# Patient Record
Sex: MALE | Race: OTHER | HISPANIC OR LATINO | URBAN - METROPOLITAN AREA
[De-identification: names, ages, dates, MRNs, and addresses within clinical notes are randomized per-mention and may not be internally consistent; named-entity substitution may affect disease eponyms.]

---

## 2018-09-21 ENCOUNTER — EMERGENCY (EMERGENCY)
Facility: HOSPITAL | Age: 48
LOS: 1 days | Discharge: HOME | End: 2018-09-21
Admitting: EMERGENCY MEDICINE
Payer: COMMERCIAL

## 2018-09-21 VITALS
RESPIRATION RATE: 17 BRPM | WEIGHT: 169.98 LBS | OXYGEN SATURATION: 97 % | SYSTOLIC BLOOD PRESSURE: 138 MMHG | TEMPERATURE: 98 F | HEART RATE: 88 BPM | DIASTOLIC BLOOD PRESSURE: 79 MMHG

## 2018-09-21 DIAGNOSIS — L02.411 CUTANEOUS ABSCESS OF RIGHT AXILLA: ICD-10-CM

## 2018-09-21 DIAGNOSIS — E11.9 TYPE 2 DIABETES MELLITUS WITHOUT COMPLICATIONS: ICD-10-CM

## 2018-09-21 DIAGNOSIS — Z79.2 LONG TERM (CURRENT) USE OF ANTIBIOTICS: ICD-10-CM

## 2018-09-21 DIAGNOSIS — Z79.899 OTHER LONG TERM (CURRENT) DRUG THERAPY: ICD-10-CM

## 2018-09-21 PROCEDURE — 99283 EMERGENCY DEPT VISIT LOW MDM: CPT

## 2018-09-21 NOTE — ED ADULT NURSE NOTE - BREATHING, MLM
45 y/o  Spontaneous, unlabored and symmetrical 47 y/o  female presents to [facility] for pre op evaluation with h/o umbilical hernia in preparation for umbilical hernia repair on 09/08/17.

## 2018-09-21 NOTE — ED PROVIDER NOTE - MEDICAL DECISION MAKING DETAILS
47 y/o m right axillary abscess; needle aspiration with no pus, will d/c on clindamycin, warm compresses and f/u surgery

## 2018-09-21 NOTE — ED PROVIDER NOTE - OBJECTIVE STATEMENT
47 y/o m hx recurrent axillary abscesses presents c/o pain, redness to right axilla for the past 3 weeks.  Pt stating it opened up this morning, small amount of pus came out.  Denies fever, chills, all other ROS negative.

## 2018-09-21 NOTE — ED ADULT NURSE NOTE - NSIMPLEMENTINTERV_GEN_ALL_ED
Implemented All Universal Safety Interventions:  [place] to call system. Call bell, personal items and telephone within reach. Instruct patient to call for assistance. Room bathroom lighting operational. Non-slip footwear when patient is off stretcher. Physically safe environment: no spills, clutter or unnecessary equipment. Stretcher in lowest position, wheels locked, appropriate side rails in place.

## 2023-10-17 ENCOUNTER — EMERGENCY (EMERGENCY)
Facility: HOSPITAL | Age: 53
LOS: 1 days | Discharge: HOME | End: 2023-10-17
Attending: EMERGENCY MEDICINE | Admitting: EMERGENCY MEDICINE
Payer: COMMERCIAL

## 2023-10-17 VITALS
RESPIRATION RATE: 18 BRPM | OXYGEN SATURATION: 98 % | DIASTOLIC BLOOD PRESSURE: 100 MMHG | HEART RATE: 84 BPM | SYSTOLIC BLOOD PRESSURE: 170 MMHG | TEMPERATURE: 98 F

## 2023-10-17 VITALS
SYSTOLIC BLOOD PRESSURE: 155 MMHG | OXYGEN SATURATION: 98 % | RESPIRATION RATE: 16 BRPM | HEART RATE: 78 BPM | DIASTOLIC BLOOD PRESSURE: 67 MMHG

## 2023-10-17 LAB
ALBUMIN SERPL ELPH-MCNC: 4.7 G/DL — SIGNIFICANT CHANGE UP (ref 3.3–5)
ALBUMIN SERPL ELPH-MCNC: 4.7 G/DL — SIGNIFICANT CHANGE UP (ref 3.3–5)
ALP SERPL-CCNC: 66 U/L — SIGNIFICANT CHANGE UP (ref 40–120)
ALP SERPL-CCNC: 66 U/L — SIGNIFICANT CHANGE UP (ref 40–120)
ALT FLD-CCNC: 23 U/L — SIGNIFICANT CHANGE UP (ref 10–45)
ALT FLD-CCNC: 23 U/L — SIGNIFICANT CHANGE UP (ref 10–45)
ANION GAP SERPL CALC-SCNC: 10 MMOL/L — SIGNIFICANT CHANGE UP (ref 5–17)
ANION GAP SERPL CALC-SCNC: 10 MMOL/L — SIGNIFICANT CHANGE UP (ref 5–17)
APPEARANCE UR: CLEAR — SIGNIFICANT CHANGE UP
APPEARANCE UR: CLEAR — SIGNIFICANT CHANGE UP
AST SERPL-CCNC: 24 U/L — SIGNIFICANT CHANGE UP (ref 10–40)
AST SERPL-CCNC: 24 U/L — SIGNIFICANT CHANGE UP (ref 10–40)
BASOPHILS # BLD AUTO: 0.05 K/UL — SIGNIFICANT CHANGE UP (ref 0–0.2)
BASOPHILS # BLD AUTO: 0.05 K/UL — SIGNIFICANT CHANGE UP (ref 0–0.2)
BASOPHILS NFR BLD AUTO: 0.9 % — SIGNIFICANT CHANGE UP (ref 0–2)
BASOPHILS NFR BLD AUTO: 0.9 % — SIGNIFICANT CHANGE UP (ref 0–2)
BILIRUB SERPL-MCNC: 0.3 MG/DL — SIGNIFICANT CHANGE UP (ref 0.2–1.2)
BILIRUB SERPL-MCNC: 0.3 MG/DL — SIGNIFICANT CHANGE UP (ref 0.2–1.2)
BILIRUB UR-MCNC: NEGATIVE — SIGNIFICANT CHANGE UP
BILIRUB UR-MCNC: NEGATIVE — SIGNIFICANT CHANGE UP
BUN SERPL-MCNC: 11 MG/DL — SIGNIFICANT CHANGE UP (ref 7–23)
BUN SERPL-MCNC: 11 MG/DL — SIGNIFICANT CHANGE UP (ref 7–23)
CALCIUM SERPL-MCNC: 10.1 MG/DL — SIGNIFICANT CHANGE UP (ref 8.4–10.5)
CALCIUM SERPL-MCNC: 10.1 MG/DL — SIGNIFICANT CHANGE UP (ref 8.4–10.5)
CHLORIDE SERPL-SCNC: 97 MMOL/L — SIGNIFICANT CHANGE UP (ref 96–108)
CHLORIDE SERPL-SCNC: 97 MMOL/L — SIGNIFICANT CHANGE UP (ref 96–108)
CO2 SERPL-SCNC: 29 MMOL/L — SIGNIFICANT CHANGE UP (ref 22–31)
CO2 SERPL-SCNC: 29 MMOL/L — SIGNIFICANT CHANGE UP (ref 22–31)
COLOR SPEC: YELLOW — SIGNIFICANT CHANGE UP
COLOR SPEC: YELLOW — SIGNIFICANT CHANGE UP
CREAT SERPL-MCNC: 0.78 MG/DL — SIGNIFICANT CHANGE UP (ref 0.5–1.3)
CREAT SERPL-MCNC: 0.78 MG/DL — SIGNIFICANT CHANGE UP (ref 0.5–1.3)
DIFF PNL FLD: NEGATIVE — SIGNIFICANT CHANGE UP
DIFF PNL FLD: NEGATIVE — SIGNIFICANT CHANGE UP
EGFR: 108 ML/MIN/1.73M2 — SIGNIFICANT CHANGE UP
EGFR: 108 ML/MIN/1.73M2 — SIGNIFICANT CHANGE UP
EOSINOPHIL # BLD AUTO: 0.18 K/UL — SIGNIFICANT CHANGE UP (ref 0–0.5)
EOSINOPHIL # BLD AUTO: 0.18 K/UL — SIGNIFICANT CHANGE UP (ref 0–0.5)
EOSINOPHIL NFR BLD AUTO: 3.1 % — SIGNIFICANT CHANGE UP (ref 0–6)
EOSINOPHIL NFR BLD AUTO: 3.1 % — SIGNIFICANT CHANGE UP (ref 0–6)
GLUCOSE SERPL-MCNC: 170 MG/DL — HIGH (ref 70–99)
GLUCOSE SERPL-MCNC: 170 MG/DL — HIGH (ref 70–99)
GLUCOSE UR QL: >=1000
GLUCOSE UR QL: >=1000
HCT VFR BLD CALC: 44.3 % — SIGNIFICANT CHANGE UP (ref 39–50)
HCT VFR BLD CALC: 44.3 % — SIGNIFICANT CHANGE UP (ref 39–50)
HGB BLD-MCNC: 15 G/DL — SIGNIFICANT CHANGE UP (ref 13–17)
HGB BLD-MCNC: 15 G/DL — SIGNIFICANT CHANGE UP (ref 13–17)
IMM GRANULOCYTES NFR BLD AUTO: 0.3 % — SIGNIFICANT CHANGE UP (ref 0–0.9)
IMM GRANULOCYTES NFR BLD AUTO: 0.3 % — SIGNIFICANT CHANGE UP (ref 0–0.9)
KETONES UR-MCNC: NEGATIVE — SIGNIFICANT CHANGE UP
KETONES UR-MCNC: NEGATIVE — SIGNIFICANT CHANGE UP
LEUKOCYTE ESTERASE UR-ACNC: NEGATIVE — SIGNIFICANT CHANGE UP
LEUKOCYTE ESTERASE UR-ACNC: NEGATIVE — SIGNIFICANT CHANGE UP
LIDOCAIN IGE QN: 127 U/L — HIGH (ref 7–60)
LIDOCAIN IGE QN: 127 U/L — HIGH (ref 7–60)
LYMPHOCYTES # BLD AUTO: 1.67 K/UL — SIGNIFICANT CHANGE UP (ref 1–3.3)
LYMPHOCYTES # BLD AUTO: 1.67 K/UL — SIGNIFICANT CHANGE UP (ref 1–3.3)
LYMPHOCYTES # BLD AUTO: 29.2 % — SIGNIFICANT CHANGE UP (ref 13–44)
LYMPHOCYTES # BLD AUTO: 29.2 % — SIGNIFICANT CHANGE UP (ref 13–44)
MCHC RBC-ENTMCNC: 30.6 PG — SIGNIFICANT CHANGE UP (ref 27–34)
MCHC RBC-ENTMCNC: 30.6 PG — SIGNIFICANT CHANGE UP (ref 27–34)
MCHC RBC-ENTMCNC: 33.9 GM/DL — SIGNIFICANT CHANGE UP (ref 32–36)
MCHC RBC-ENTMCNC: 33.9 GM/DL — SIGNIFICANT CHANGE UP (ref 32–36)
MCV RBC AUTO: 90.4 FL — SIGNIFICANT CHANGE UP (ref 80–100)
MCV RBC AUTO: 90.4 FL — SIGNIFICANT CHANGE UP (ref 80–100)
MONOCYTES # BLD AUTO: 0.39 K/UL — SIGNIFICANT CHANGE UP (ref 0–0.9)
MONOCYTES # BLD AUTO: 0.39 K/UL — SIGNIFICANT CHANGE UP (ref 0–0.9)
MONOCYTES NFR BLD AUTO: 6.8 % — SIGNIFICANT CHANGE UP (ref 2–14)
MONOCYTES NFR BLD AUTO: 6.8 % — SIGNIFICANT CHANGE UP (ref 2–14)
NEUTROPHILS # BLD AUTO: 3.41 K/UL — SIGNIFICANT CHANGE UP (ref 1.8–7.4)
NEUTROPHILS # BLD AUTO: 3.41 K/UL — SIGNIFICANT CHANGE UP (ref 1.8–7.4)
NEUTROPHILS NFR BLD AUTO: 59.7 % — SIGNIFICANT CHANGE UP (ref 43–77)
NEUTROPHILS NFR BLD AUTO: 59.7 % — SIGNIFICANT CHANGE UP (ref 43–77)
NITRITE UR-MCNC: NEGATIVE — SIGNIFICANT CHANGE UP
NITRITE UR-MCNC: NEGATIVE — SIGNIFICANT CHANGE UP
NRBC # BLD: 0 /100 WBCS — SIGNIFICANT CHANGE UP (ref 0–0)
NRBC # BLD: 0 /100 WBCS — SIGNIFICANT CHANGE UP (ref 0–0)
PH UR: 7.5 — SIGNIFICANT CHANGE UP (ref 5–8)
PH UR: 7.5 — SIGNIFICANT CHANGE UP (ref 5–8)
PLATELET # BLD AUTO: 216 K/UL — SIGNIFICANT CHANGE UP (ref 150–400)
PLATELET # BLD AUTO: 216 K/UL — SIGNIFICANT CHANGE UP (ref 150–400)
POTASSIUM SERPL-MCNC: 4.4 MMOL/L — SIGNIFICANT CHANGE UP (ref 3.5–5.3)
POTASSIUM SERPL-MCNC: 4.4 MMOL/L — SIGNIFICANT CHANGE UP (ref 3.5–5.3)
POTASSIUM SERPL-SCNC: 4.4 MMOL/L — SIGNIFICANT CHANGE UP (ref 3.5–5.3)
POTASSIUM SERPL-SCNC: 4.4 MMOL/L — SIGNIFICANT CHANGE UP (ref 3.5–5.3)
PROT SERPL-MCNC: 7.8 G/DL — SIGNIFICANT CHANGE UP (ref 6–8.3)
PROT SERPL-MCNC: 7.8 G/DL — SIGNIFICANT CHANGE UP (ref 6–8.3)
PROT UR-MCNC: NEGATIVE MG/DL — SIGNIFICANT CHANGE UP
PROT UR-MCNC: NEGATIVE MG/DL — SIGNIFICANT CHANGE UP
RBC # BLD: 4.9 M/UL — SIGNIFICANT CHANGE UP (ref 4.2–5.8)
RBC # BLD: 4.9 M/UL — SIGNIFICANT CHANGE UP (ref 4.2–5.8)
RBC # FLD: 11.9 % — SIGNIFICANT CHANGE UP (ref 10.3–14.5)
RBC # FLD: 11.9 % — SIGNIFICANT CHANGE UP (ref 10.3–14.5)
SODIUM SERPL-SCNC: 136 MMOL/L — SIGNIFICANT CHANGE UP (ref 135–145)
SODIUM SERPL-SCNC: 136 MMOL/L — SIGNIFICANT CHANGE UP (ref 135–145)
SP GR SPEC: 1.01 — SIGNIFICANT CHANGE UP (ref 1–1.03)
SP GR SPEC: 1.01 — SIGNIFICANT CHANGE UP (ref 1–1.03)
UROBILINOGEN FLD QL: 0.2 E.U./DL — SIGNIFICANT CHANGE UP
UROBILINOGEN FLD QL: 0.2 E.U./DL — SIGNIFICANT CHANGE UP
WBC # BLD: 5.72 K/UL — SIGNIFICANT CHANGE UP (ref 3.8–10.5)
WBC # BLD: 5.72 K/UL — SIGNIFICANT CHANGE UP (ref 3.8–10.5)
WBC # FLD AUTO: 5.72 K/UL — SIGNIFICANT CHANGE UP (ref 3.8–10.5)
WBC # FLD AUTO: 5.72 K/UL — SIGNIFICANT CHANGE UP (ref 3.8–10.5)

## 2023-10-17 PROCEDURE — 81003 URINALYSIS AUTO W/O SCOPE: CPT

## 2023-10-17 PROCEDURE — 99284 EMERGENCY DEPT VISIT MOD MDM: CPT | Mod: 25

## 2023-10-17 PROCEDURE — 80053 COMPREHEN METABOLIC PANEL: CPT

## 2023-10-17 PROCEDURE — 74177 CT ABD & PELVIS W/CONTRAST: CPT | Mod: MA

## 2023-10-17 PROCEDURE — 36415 COLL VENOUS BLD VENIPUNCTURE: CPT

## 2023-10-17 PROCEDURE — 85025 COMPLETE CBC W/AUTO DIFF WBC: CPT

## 2023-10-17 PROCEDURE — 83690 ASSAY OF LIPASE: CPT

## 2023-10-17 PROCEDURE — 74177 CT ABD & PELVIS W/CONTRAST: CPT | Mod: 26,MA

## 2023-10-17 PROCEDURE — 99285 EMERGENCY DEPT VISIT HI MDM: CPT

## 2023-10-17 RX ORDER — SODIUM CHLORIDE 9 MG/ML
1000 INJECTION INTRAMUSCULAR; INTRAVENOUS; SUBCUTANEOUS ONCE
Refills: 0 | Status: COMPLETED | OUTPATIENT
Start: 2023-10-17 | End: 2023-10-17

## 2023-10-17 RX ADMIN — SODIUM CHLORIDE 1000 MILLILITER(S): 9 INJECTION INTRAMUSCULAR; INTRAVENOUS; SUBCUTANEOUS at 18:58

## 2023-10-17 NOTE — ED PROVIDER NOTE - OBJECTIVE STATEMENT
53 yr old M with PMH DM presents with 4-5 days of RUQ and R side pain. Reports pain started when he was at work ([employer], requires heavy lifting), had to go home due to pain. Located under R rib laterally and continues along RUQ. Describes pain as stabbing, 5/10 in severity, not associated with PO intake, and occurs intermittently for 20 minute periods 6-7 times per day. Reports Motrin 800mg manages the pain, last taken today 2pm. Denies injury or strain. Reports he noted a lump on his right side 2-3 weeks ago where the pain is located. Denies fever, CP, SOB, cough, N/V, diarrhea, constipation, melena/hematochezia, dysuria, hematuria. Denies cigarette use, reports cocaine use on weekends.

## 2023-10-17 NOTE — ED PROVIDER NOTE - ATTENDING APP SHARED VISIT CONTRIBUTION OF CARE
53 yr old M with PMH DM presents with 4-5 days of RUQ and R side pain. Reports pain started when he was at work ([place], requires heavy lifting), had to go home due to pain. Located under R rib laterally and continues along RUQ. Describes pain as stabbing, 5/10 in severity, not associated with PO intake, and occurs intermittently for 20 minute periods 6-7 times per day. Reports Motrin 800mg manages the pain, last taken today 2pm. Denies injury or strain. Reports he noted a lump on his right side 2-3 weeks ago where the pain is located. Denies fever, CP, SOB, cough, N/V, diarrhea, constipation, melena/hematochezia, dysuria, hematuria. Denies cigarette use, reports cocaine use on weekends  vss  s1s2 lungs cta bl  abd + mild R upper abd ttp no rebound no guarding  IMP- ABD pain  elevated lipase- nml lfts and wbc count- check CT a/p

## 2023-10-17 NOTE — ED PROVIDER NOTE - PATIENT PORTAL LINK FT
You can access the FollowMyHealth Patient Portal offered by [facility] by registering at the following website: [email or web address]. By joining [facility]’s FollowMyHealth portal, you will also be able to view your health information using other applications (apps) compatible with our system.

## 2023-10-17 NOTE — ED ADULT NURSE NOTE - NSFALLUNIVINTERV_ED_ALL_ED
Bed/Stretcher in lowest position, wheels locked, appropriate side rails in place/Call bell, personal items and telephone in reach/Instruct patient to call for assistance before getting out of bed/chair/stretcher/Non-slip footwear applied when patient is off stretcher/[place] to call system/Physically safe environment - no spills, clutter or unnecessary equipment/Purposeful proactive rounding/Room/bathroom lighting operational, light cord in reach

## 2023-10-17 NOTE — ED PROVIDER NOTE - NSFOLLOWUPINSTRUCTIONS_ED_ALL_ED_FT
Pancreatitis aguda  Acute Pancreatitis  Body outline showing body parts used in digestion. A close-up shows a normal pancreas and a swollen pancreas.  La pancreatitis aguda se produce cuando hay hinchazón e irritación repentinas del páncreas. El páncreas es arthur glándula del cuerpo que ayuda a controlar el azúcar en la liana. Esta glándula también ayuda a digerir los alimentos.    Esta afección puede durar algunos días y causar problemas graves. Algunos problemas pueden ser potencialmente mortales. Los pulmones, el corazón y los riñones pueden dejar de funcionar.    ¿Cuáles son las causas?  Las causas pueden ser las siguientes:  Consumo excesivo de alcohol.  Consumo de drogas.  Cálculos biliares.  Crecimiento anormal de tejido (tumor) en el páncreas.  Otras causas son:  Algunos medicamentos o algunas sustancias químicas.  Diabetes o infección.  Niveles altos de un tipo de grasa en la liana.  Niveles altos de calcio en la liana.  Daños causados por:  Un accidente.  La sustancia venenosa (veneno) de arthur picadura de escorpión.  Arthur ciurgía en el vientre (abdominal).  El ataque por parte del sistema de defensa del cuerpo (sistema inmunitario) al páncreas (pancreatitis autoinmunitaria).  Genes que se transmiten de padres a hijos (hereditarios).  A veces, la causa es desconocida.    ¿Cuáles son los signos o síntomas?  Dolor en la parte superior del vientre que puede sentirse en la espalda. El dolor puede ser muy intenso. Suele empeorar después de comer.  Dolor e hinchazón en el vientre.  Sensación de que va a vomitar (náuseas) y vómitos.  Fiebre.  ¿Cómo se trata?  Arthur hospitalización, en muchos casos.  Analgésicos.  Líquidos a través de un tubo (catéter) intravenoso.  Colocación de un tubo en el estómago para extraer el contenido del estómago. [place] también lo ayuda a dejar de vomitar.  No comiendo hasta que vomite menos.  Antibióticos en el alonzo de arthur infección.  Medicamentos con corticoesteroides, si la causa de zimmerman problema son los ataques de zimmerman sistema de defensa contra los tejidos propios del cuerpo.  Cirugía, si la causa del problema son cálculos biliares u otra obstrucción.  Tratamiento de otro problema de juan luis que pueda ser la causa.  Siga estas indicaciones en zimmerman casa:  Medicamentos    Use los medicamentos de venta juan carlos y los recetados solamente juliana se lo haya indicado el médico.  Si le recetaron un antibiótico, tómelo juliana se lo haya indicado el médico. No deje de tomarlo aunque comience a sentirse mejor.  Si se lo indican, tome medidas a fin de prevenir problemas para ir de cuerpo (estreñimiento). Es posible que deba hacer lo siguiente:  [place]icruz medicamentos. Le dirán qué medicamentos debe maricruz.  [place] alimentos ricos en fibra. Entre ellos, frijoles, cereales integrales y frutas y verduras frescas.  Limitar los alimentos con alto contenido de grasa y azúcar. Estos incluyen alimentos fritos o dulces.  Pregunte al médico si debe evitar conducir o utilizar máquinas mientras giancarlo los medicamentos.  Comida y bebida    Three cups showing dark yellow, yellow, and pale yellow urine.  Siga las instrucciones del médico sobre qué comer y beber. Es posible que deba hacer lo siguiente:  Evite maricruz alcohol.  Consuma alimentos que no tengan mucha grasa.  Consuma pequeñas cantidades de comida con más frecuencia. No coma en gran cantidad.  [place] suficiente líquido para mantener el pis (la orina) de color amarillo pálido.  No consuma alcohol si esta fue la causa de zimmerman afección.  Indicaciones generales    No fume ni consuma ningún producto que contenga nicotina o tabaco. Si necesita ayuda para dejar de fumar, consulte al médico.  Descanse lo suficiente.  Controle zimmerman nivel de azúcar en la liana en zimmerman casa sang juliana le indicó el médico.  Concurra a todas las visitas de seguimiento.  Comuníquese con un médico si:  No mejora tan rápido juliana esperaba.  Sonya síntomas empeoran.  Aparecen nuevos síntomas.  Tiene dolor o debilidad que dura mucho tiempo.  Tiene ganas continuas de vomitar.  Mejora y luego el dolor reaparece.  Tiene fiebre.  Solicite ayuda de inmediato si:  Vomita cada vez que come o maria de jesus.  El dolor es muy intenso.  La piel y la parte virginie de los ojos se ponen amarillos.  El vientre se le hincha de manera súbita.  Se siente mareado o se desmaya.  Zimmerman nivel de azúcar en la liana es alto (más de 300 mg/dl).  Vomita liana.  Estos síntomas pueden indicar arthur emergencia. No espere a arlene si los síntomas desaparecen. Solicite ayuda de inmediato. Llame al 911.    Resumen  La pancreatitis aguda se produce cuando hay hinchazón e irritación repentinas del páncreas.  Normalmente, esta afección es causada por el consumo excesivo de alcohol, el consumo de drogas o la presencia de cálculos biliares.  Es probable que deba permanecer en el hospital para recibir tratamiento.  Esta información no tiene juliana fin reemplazar el consejo del médico. Asegúrese de hacerle al médico cualquier pregunta que tenga.    Document Revised: 11/17/2022 Document Reviewed: 11/17/2022

## 2023-10-17 NOTE — ED ADULT NURSE NOTE - SUICIDE SCREENING QUESTION 2
Patient is resting comfortably.  at bedside. Updated the patient that we are waiting a call back from Cardiologist. Patient is not in any acute distress. Will continue to monitor   No

## 2023-10-17 NOTE — ED PROVIDER NOTE - CLINICAL SUMMARY MEDICAL DECISION MAKING FREE TEXT BOX
52 yo M with PMH DM presents with 4-5 days of intermittent RUQ and R side pain not associated with PO intake. VS on arrival T 98.5, HR 84, /100, RR 18, SpO2 98% on RA. Physical exam remarkable for mild RUQ tenderness, R side tender to palpation, and R 2cm lump at area of tenderness. CBC unremarkable. UA remarkable for glucose >1000. Will administer IV fluids, obtain CMP and lipase. Differentials include cholecystitis, hepatitis, fatty liver, GERD, PUD, pancreatitis, PNA, MSK/trauma. 52 yo M with PMH DM presents with 4-5 days of intermittent RUQ and R side pain not associated with PO intake. VS on arrival T 98.5, HR 84, /100, RR 18, SpO2 98% on RA. Physical exam remarkable for mild RUQ tenderness, R side tender to palpation, and R 2cm lump at area of tenderness. CBC unremarkable. UA remarkable for glucose >1000. Will administer IV fluids, obtain CMP and lipase. Differentials include cholecystitis, biliary colic, hepatitis, fatty liver, GERD, PUD, pancreatitis, PNA, MSK/trauma. 52 yo M with PMH DM presents with 4-5 days of intermittent RUQ and R side pain not associated with PO intake. VS on arrival T 98.5, HR 84, /100, RR 18, SpO2 98% on RA. Physical exam remarkable for mild RUQ tenderness, R side tender to palpation, and R 2cm lump at area of tenderness. CBC unremarkable. UA remarkable for glucose >1000. Will administer IV fluids, obtain CMP and lipase. Differentials include cholecystitis, biliary colic, hepatitis, fatty liver, GERD, PUD, pancreatitis, PNA, MSK/trauma. - lipase 2x upper limit of nml- check ct a/p

## 2023-10-19 DIAGNOSIS — K85.90 ACUTE PANCREATITIS WITHOUT NECROSIS OR INFECTION, UNSPECIFIED: ICD-10-CM

## 2023-10-19 DIAGNOSIS — Z79.84 LONG TERM (CURRENT) USE OF ORAL HYPOGLYCEMIC DRUGS: ICD-10-CM

## 2023-10-19 DIAGNOSIS — R10.11 RIGHT UPPER QUADRANT PAIN: ICD-10-CM

## 2023-10-19 DIAGNOSIS — E11.9 TYPE 2 DIABETES MELLITUS WITHOUT COMPLICATIONS: ICD-10-CM
